# Patient Record
Sex: FEMALE | Race: AMERICAN INDIAN OR ALASKA NATIVE
[De-identification: names, ages, dates, MRNs, and addresses within clinical notes are randomized per-mention and may not be internally consistent; named-entity substitution may affect disease eponyms.]

---

## 2017-07-28 NOTE — PET REPORT
PET SB TO MT SUBSEQUENT:



HISTORY: Restaging of breast cancer.



TECHNIQUE: 13.8 millicuries F-18 FDG was administered intravenously.  

Noncontrast CT images and PET images were obtained from the skull base 

to the proximal thighs.  Fused images were reviewed on a workstation.  

The patient's blood glucose level measured 107.



COMPARISON: 1/16/14.





FINDINGS:



BRAIN: physiologic FDG uptake in the imaged brain.  



NECK: physiologic FDG uptake.



MEDIASTINUM: physiologic FDG uptake.



LUNGS: physiologic FDG uptake.



CHEST WALL: Surgical changes are noted in both breasts. No recurrent 

breast mass or chest wall mass is appreciated.



PLEURA/PERICARDIUM: physiologic FDG uptake. There is a new layering 

left pleural effusion measuring up to 2 cm in thickness. No pleural 

mass or nodularity is appreciated on noncontrast CT images.



THORACIC LYMPH NODES: physiologic FDG uptake.



HEPATOBILIARY: physiologic FDG uptake.  Mean liver SUV measures .



PANCREAS: physiologic FDG uptake.



SPLEEN: physiologic FDG uptake.



ADRENAL GLANDS: physiologic FDG uptake.



KIDNEYS/RENAL COLLECTING SYSTEMS: physiologic FDG uptake.



BOWEL/MESENTERY: physiologic FDG uptake.



PELVIC VISCERA: physiologic FDG uptake.



ABDOMINAL/PELVIC LYMPH NODES: physiologic FDG uptake.



MUSCULOSKELETAL: There is a new focus of increased radiotracer uptake 

in the proximal right femur with Max SUV measuring 7.7. There is subtle 

sclerosis in this area on the CT images. No additional bony lesions are 

appreciated.







IMPRESSION:

PET imaging demonstrates a new solitary bony metastasis to the proximal 

right femur since 1/16/14 exam.



There is a new small layering left pleural effusion as outlined above. 

No obvious pleural metastasis on the CT images or PET images.

## 2017-09-01 NOTE — ULTRASOUND REPORT
ULTRASOUND CHEST



History: Left pleural effusion.



Findings: Targeted grayscale ultrasound of the left side of the chest 

demonstrates a small left pleural effusion estimated at 17 cc. 

Thoracentesis was not performed.



Impression: Small left pleural effusion.

## 2017-09-22 ENCOUNTER — HOSPITAL ENCOUNTER (OUTPATIENT)
Dept: HOSPITAL 5 - XRAY | Age: 57
Discharge: HOME | End: 2017-09-22
Attending: SPECIALIST
Payer: MEDICARE

## 2017-09-22 DIAGNOSIS — M47.894: ICD-10-CM

## 2017-09-22 DIAGNOSIS — J90: Primary | ICD-10-CM

## 2017-09-22 PROCEDURE — 71020: CPT

## 2017-09-22 NOTE — XRAY REPORT
CHEST 2 VIEWS



INDICATION: Left effusion.



COMPARISON: 3/29/2010



FINDINGS: PA and lateral chest radiographs demonstrate increased left 

lower lung opacity/fluid with obscured left hemidiaphragm.  Clear 

remainder lungs.  New bilateral breast surgical clips with asymmetric 

soft tissue shadows.  Grossly normal heart size.  Stable chest port tip 

along the distal SVC.  Mild thoracic spondylosis.  Few surgical clips 

also project in the lower back soft tissues and upper anterior 

abdominal wall.



CONCLUSION: New bilateral breast postsurgical changes with increased 

left lower lung opacity/effusion and stable right chest port since 

2010, as described.



Thank you for the opportunity to participate in this patient's care.

## 2017-10-09 ENCOUNTER — HOSPITAL ENCOUNTER (OUTPATIENT)
Dept: HOSPITAL 5 - XRAY | Age: 57
Discharge: HOME | End: 2017-10-09
Attending: INTERNAL MEDICINE
Payer: MEDICARE

## 2017-10-09 DIAGNOSIS — I10: ICD-10-CM

## 2017-10-09 DIAGNOSIS — M89.8X5: ICD-10-CM

## 2017-10-09 DIAGNOSIS — M17.11: Primary | ICD-10-CM

## 2017-10-09 DIAGNOSIS — C50.912: ICD-10-CM

## 2017-10-09 NOTE — XRAY REPORT
RIGHT FEMUR RADIOGRAPHS



INDICATION: Bone lesion of right femur.



COMPARISON: 7/27/2017 PET/CT.



FINDINGS: AP and lateral radiographs demonstrate intact right femur.  

Small hypermetabolic focus felt along the right proximal femur lateral 

cortex at or just below the level of the lesser trochanter not clearly 

identified on the plain radiographs.  Unremarkable soft tissues.  Mild 

right knee degenerative changes.  Few right hemipelvic surgical clips.



CONCLUSION: No definite right femur plain radiographic abnormality with 

few other findings, as described above.  Please correlate.



Thank you for the opportunity to participate in this patient's care.

## 2018-01-09 ENCOUNTER — HOSPITAL ENCOUNTER (OUTPATIENT)
Dept: HOSPITAL 5 - CATHLABREC | Age: 58
Discharge: HOME | End: 2018-01-09
Attending: INTERNAL MEDICINE
Payer: MEDICARE

## 2018-01-09 DIAGNOSIS — Z53.8: ICD-10-CM

## 2018-01-09 DIAGNOSIS — C50.912: Primary | ICD-10-CM

## 2018-01-11 ENCOUNTER — HOSPITAL ENCOUNTER (OUTPATIENT)
Dept: HOSPITAL 5 - CATHLABREC | Age: 58
Discharge: HOME | End: 2018-01-11
Attending: INTERNAL MEDICINE
Payer: MEDICARE

## 2018-01-11 VITALS — SYSTOLIC BLOOD PRESSURE: 113 MMHG | DIASTOLIC BLOOD PRESSURE: 73 MMHG

## 2018-01-11 DIAGNOSIS — E66.9: ICD-10-CM

## 2018-01-11 DIAGNOSIS — I10: ICD-10-CM

## 2018-01-11 DIAGNOSIS — D36.7: Primary | ICD-10-CM

## 2018-01-11 DIAGNOSIS — M79.1: ICD-10-CM

## 2018-01-11 DIAGNOSIS — Z85.3: ICD-10-CM

## 2018-01-11 DIAGNOSIS — E55.9: ICD-10-CM

## 2018-01-11 DIAGNOSIS — C79.51: ICD-10-CM

## 2018-01-11 DIAGNOSIS — F32.9: ICD-10-CM

## 2018-01-11 DIAGNOSIS — D50.9: ICD-10-CM

## 2018-01-11 LAB
APTT BLD: 32.6 SEC. (ref 24.2–36.6)
BASOPHILS # (AUTO): 0 K/MM3 (ref 0–0.1)
BASOPHILS NFR BLD AUTO: 0.7 % (ref 0–1.8)
BUN SERPL-MCNC: 10 MG/DL (ref 7–17)
EOSINOPHIL # BLD AUTO: 0.3 K/MM3 (ref 0–0.4)
EOSINOPHIL NFR BLD AUTO: 5.2 % (ref 0–4.3)
HCT VFR BLD CALC: 36.8 % (ref 30.3–42.9)
HGB BLD-MCNC: 12.1 GM/DL (ref 10.1–14.3)
INR PPP: 1.06 (ref 0.87–1.13)
LYMPHOCYTES # BLD AUTO: 1 K/MM3 (ref 1.2–5.4)
LYMPHOCYTES NFR BLD AUTO: 18.4 % (ref 13.4–35)
MCH RBC QN AUTO: 28 PG (ref 28–32)
MCHC RBC AUTO-ENTMCNC: 33 % (ref 30–34)
MCV RBC AUTO: 85 FL (ref 79–97)
MONOCYTES # (AUTO): 0.4 K/MM3 (ref 0–0.8)
MONOCYTES % (AUTO): 7.6 % (ref 0–7.3)
PLATELET # BLD: 291 K/MM3 (ref 140–440)
RBC # BLD AUTO: 4.32 M/MM3 (ref 3.65–5.03)

## 2018-01-11 PROCEDURE — 84520 ASSAY OF UREA NITROGEN: CPT

## 2018-01-11 PROCEDURE — 88333 PATH CONSLTJ SURG CYTO XM 1: CPT

## 2018-01-11 PROCEDURE — 88305 TISSUE EXAM BY PATHOLOGIST: CPT

## 2018-01-11 PROCEDURE — 85025 COMPLETE CBC W/AUTO DIFF WBC: CPT

## 2018-01-11 PROCEDURE — 85730 THROMBOPLASTIN TIME PARTIAL: CPT

## 2018-01-11 PROCEDURE — 36415 COLL VENOUS BLD VENIPUNCTURE: CPT

## 2018-01-11 PROCEDURE — 20206 BIOPSY MUSCLE PERQ NEEDLE: CPT

## 2018-01-11 PROCEDURE — 88307 TISSUE EXAM BY PATHOLOGIST: CPT

## 2018-01-11 PROCEDURE — 77012 CT SCAN FOR NEEDLE BIOPSY: CPT

## 2018-01-11 PROCEDURE — 85610 PROTHROMBIN TIME: CPT

## 2018-01-11 PROCEDURE — 82565 ASSAY OF CREATININE: CPT

## 2018-01-11 NOTE — CAT SCAN REPORT
CT guided biopsy of left supraclavicular mass.



History: Breast cancer with palpable lump in the left supraclavicular 

region.



Procedure: The patient's skin surface overlying the left 

supraventricular region was prepped and draped using sterile technique. 

Local anesthetic was injected into the skin. There is increased soft 

tissue density in the left supraclavicular region which may represent 

edematous muscle. No enlarged lymph nodes are identified. Using CT 

guidance, a 17-gauge sheath needle was advanced into the area of 

interest in a single pass was made using an 18-gauge biopsy gun. 

Adequate tissue was obtained. Initial touch prep slides demonstrated 

muscle tissue, and thus the procedure was terminated. Intravenous 

conscious sedation was used. Intraservice time was 15 minutes. 

Independent cardiorespiratory monitoring was performed by the 

outpatient procedure nurse for 15 minutes. The patient tolerated the 

procedure well and was sent to the outpatient procedure unit for short 

term observation in satisfactory condition. There were no complications.

## 2018-01-11 NOTE — SHORT STAY SUMMARY
Short Stay Documentation


Date of service: 01/11/18





- History


Principal diagnosis: Breast cancerwith neckmass


Past Medical History: cancer





- Allergies and Medications


Current Medications: 


 Allergies





No Known Allergies Allergy (Verified 08/30/17 06:59)


 





 Home Medications











 Medication  Instructions  Recorded  Confirmed  Last Taken  Type


 


Duloxetine HCl [Cymbalta] 20 mg PO QDAY 08/30/17 01/11/18 01/10/18 History





    20mg 


 


Losartan/Hydrochlorothiazide 1 each PO DAILY 08/30/17 01/11/18 01/10/18 History





[Losartan-Hctz 100-25 mg Tab]    1 tab 


 


Gabapentin [Neurontin] 600 mg PO TID 01/11/18 01/11/18 01/10/18 History





    600mg 














- Physical exam


General appearance: no acute distress





- Brief post op/procedure progress note


Date of procedure: 01/11/18


Pre-op diagnosis: L neck mass


Post-op diagnosis: same


Procedure: 





Bx Lneckmass


Anesthesia: local


Surgeon: MAYCOL CARTER


Estimated blood loss: none


Specimen disposition: to lab


Condition: stable





- Disposition


Condition at discharge: Good


Disposition: DC-01 TO HOME OR SELFCARE





Short Stay Discharge Plan


Follow up with: 


FRANCISCA HERNANDEZ MD [Primary Care Provider] - 7 Days

## 2018-01-12 ENCOUNTER — HOSPITAL ENCOUNTER (OUTPATIENT)
Dept: HOSPITAL 5 - NM | Age: 58
Discharge: HOME | End: 2018-01-12
Attending: INTERNAL MEDICINE
Payer: MEDICARE

## 2018-01-12 DIAGNOSIS — C50.912: Primary | ICD-10-CM

## 2018-01-12 DIAGNOSIS — F32.9: ICD-10-CM

## 2018-01-12 DIAGNOSIS — C79.51: ICD-10-CM

## 2018-01-12 DIAGNOSIS — J32.9: ICD-10-CM

## 2018-01-12 DIAGNOSIS — Z79.899: ICD-10-CM

## 2018-01-12 DIAGNOSIS — I10: ICD-10-CM

## 2018-01-12 PROCEDURE — 78472 GATED HEART PLANAR SINGLE: CPT

## 2018-01-12 PROCEDURE — A9577 INJ MULTIHANCE: HCPCS

## 2018-01-12 PROCEDURE — 70553 MRI BRAIN STEM W/O & W/DYE: CPT

## 2018-01-12 PROCEDURE — A9560 TC99M LABELED RBC: HCPCS

## 2018-01-12 NOTE — MAGNETIC RESONANCE REPORT
MRI BRAIN WITH/WITHOUT CONTRAST:



History: Malignant neoplasm of left breast.



Technique: Multiple T1 and T2 weighted images were obtained in multiple 

planes.  Axial diffusion and gradient imaging was performed.  Post 

contrast T1 images in two planes were obtained following IV gadolinium.



Findings:



The brain parenchyma signal intensity and its gray-white interface are 

normal on all sequences.  No abnormal parenchymal signal.  



No diffusion restriction, hemorrhage, mass effect or extra-axial fluid 

collection.



Ventricular size is normal and symmetric.  The basal cisterns are clear.



The brainstem and cerebellar hemispheres are within normal limits.  The 

fourth ventricle is midline.



There is mild mucosal thickening throughout all paranasal sinuses. 

Normal flow voids are identified in the appropriate vessels at the 

[place].



No abnormal enhancement is identified following IV gadolinium.



Impression:

Unremarkable MRI brain with and without contrast. No evidence for 

metastatic disease to the brain.

Mild chronic sinusitis.

## 2018-01-12 NOTE — NUCLEAR MEDICINE REPORT
NUCLEAR MEDICINE MUGA GATED CARDIAC



History: Breast cancer



Findings:



Planar images of the heart demonstrate no obvious wall motion defects. 

The cardiac ejection fraction is measured at 63%. Heart rate measured 

80 beats per minute.



Impression:

The cardiac ejection fraction measures 63%.

## 2018-05-09 ENCOUNTER — HOSPITAL ENCOUNTER (OUTPATIENT)
Dept: HOSPITAL 5 - NM | Age: 58
Discharge: HOME | End: 2018-05-09
Attending: INTERNAL MEDICINE
Payer: MEDICARE

## 2018-05-09 DIAGNOSIS — C50.912: ICD-10-CM

## 2018-05-09 DIAGNOSIS — I10: ICD-10-CM

## 2018-05-09 DIAGNOSIS — Z79.899: ICD-10-CM

## 2018-05-09 DIAGNOSIS — C79.51: ICD-10-CM

## 2018-05-09 DIAGNOSIS — Z51.11: Primary | ICD-10-CM

## 2018-05-09 PROCEDURE — A9560 TC99M LABELED RBC: HCPCS

## 2018-05-09 PROCEDURE — 78472 GATED HEART PLANAR SINGLE: CPT

## 2018-05-10 NOTE — NUCLEAR MEDICINE REPORT
MUGA scan:



chemotherapy.



Following injection of technetium 99m tagged RBCs images of the left 

ventricle were obtained in the 30 and 35degrees projections. Ejection 

fractions of 58.4 and 64.1% respectively are obtained giving an average 

of 61.2%. In 2011 the patient had a 68% ejection fraction.



Impression:



61.2% ejection fraction.

## 2018-06-25 ENCOUNTER — HOSPITAL ENCOUNTER (OUTPATIENT)
Dept: HOSPITAL 5 - NM | Age: 58
Discharge: HOME | End: 2018-06-25
Payer: MEDICARE

## 2018-06-25 DIAGNOSIS — Z90.710: ICD-10-CM

## 2018-06-25 DIAGNOSIS — K21.9: ICD-10-CM

## 2018-06-25 DIAGNOSIS — F32.9: ICD-10-CM

## 2018-06-25 DIAGNOSIS — M19.90: ICD-10-CM

## 2018-06-25 DIAGNOSIS — I10: ICD-10-CM

## 2018-06-25 DIAGNOSIS — F41.9: ICD-10-CM

## 2018-06-25 DIAGNOSIS — C50.912: Primary | ICD-10-CM

## 2018-06-25 PROCEDURE — A9503 TC99M MEDRONATE: HCPCS

## 2018-06-25 PROCEDURE — 78306 BONE IMAGING WHOLE BODY: CPT

## 2018-06-25 NOTE — NUCLEAR MEDICINE REPORT
BONE SCAN:



History: Left breast cancer.



Comparison: No recent comparison. Correlation is made with a PET CT 

dated 11/2/17.



After injection of isotope, gamma camera imaging of the bony

system was done.  There is normal soft tissue, renal and bony activity.



There are 2 tiny foci of increased radiotracer uptake overlying the 

expected position of the T4 vertebral body and C6 vertebral body.



The previously described hypermetabolic activity in the proximal right 

femur is not demonstrated on bone scan.



No additional areas of abnormal uptake are detected.



IMPRESSION:

There are 2 tiny foci of increased radiotracer uptake at approximate T4 

and T6 levels concerning for metastatic disease. See above.

## 2018-07-05 ENCOUNTER — HOSPITAL ENCOUNTER (OUTPATIENT)
Dept: HOSPITAL 5 - PET | Age: 58
Discharge: HOME | End: 2018-07-05
Payer: MEDICARE

## 2018-07-05 DIAGNOSIS — Z90.710: ICD-10-CM

## 2018-07-05 DIAGNOSIS — Z90.13: ICD-10-CM

## 2018-07-05 DIAGNOSIS — C50.912: Primary | ICD-10-CM

## 2018-07-05 DIAGNOSIS — R91.8: ICD-10-CM

## 2018-07-05 DIAGNOSIS — I10: ICD-10-CM

## 2018-07-05 DIAGNOSIS — C79.51: ICD-10-CM

## 2018-07-05 DIAGNOSIS — D50.9: ICD-10-CM

## 2018-07-05 DIAGNOSIS — F41.9: ICD-10-CM

## 2018-07-05 DIAGNOSIS — F32.9: ICD-10-CM

## 2018-07-05 DIAGNOSIS — K21.9: ICD-10-CM

## 2018-07-05 DIAGNOSIS — E66.9: ICD-10-CM

## 2018-07-05 DIAGNOSIS — R73.09: ICD-10-CM

## 2018-07-05 DIAGNOSIS — M19.90: ICD-10-CM

## 2018-07-05 DIAGNOSIS — J90: ICD-10-CM

## 2018-07-05 PROCEDURE — 78815 PET IMAGE W/CT SKULL-THIGH: CPT

## 2018-07-05 PROCEDURE — A9552 F18 FDG: HCPCS

## 2018-07-05 PROCEDURE — 82962 GLUCOSE BLOOD TEST: CPT

## 2018-07-06 NOTE — PET REPORT
PET/CT:07/05/18 12:41:00



CLINICAL: Breast cancer restaging.

RADIOPHARMACEUTICAL: 14.949mCi F18-FDG.





COMPARISON: 11/02/17 PET/CT



TECHNIQUE-

Following intravenous injection of F-18 FDG and an approximately 60 

minute uptake period,  CT and PET images from the mid skull to the 

upper thighs were acquired with the patient in the fasted state.  No 

contrast was administered.  The CT protocol used for this PET CT study 

is designed for attenuation correction and anatomic localization of PET 

abnormalities.  This  CT is not desired to produce and cannot 

replace, state-of-the-art diagnostic CT scans with specific imaging 

protocols for different body parts and indications.



Plasma glucose at the time of this test: 100g/dl.



The standardized uptake values (SUV) are normalized to patient body 

weight and indicate the highest activity concentration (SUV max) in a 

given disease site.





FINDINGS:



Brain--Physiologic FDG uptake in the visualized regions of the brain.



Neck--Physiologic FDG uptake in mucosal structures.  No mass or 

lymphadenopathy.



Chest--Physiologic FDG uptake in mediastinal blood pool and myocardium.



Lungs--No abnormal uptake.  Decreased size and number of bilateral 

upper lobe noncalcified non-FDG avid lung nodules.  The previously 

described 4 mm left upper lobe nodule has resolved and 2 residual left 

upper lobe nodules measure 3 and 2 mm.  The previously described 8mm 

right upper lobe nodule is nearly completely gone and a poorly 

marginated low density opacity remains in the right upper lobe.



Pleura/pericardium--No abnormal uptake.  Large bilateral pleural 

effusions.  The left is unchanged compared to the previous exam and the 

right pleural effusion is new.



Thoracic nodes--No abnormal uptake.



Hepatobiliary--No abnormal uptake.  Liver background SUV mean, as a 

reference for comparing FDG studies, is 3.2 compared to 3.6 on the last 

exam.  No liver mass.

      

Spleen--No abnormal uptake.



Pancreas--No abnormal uptake.



Adrenal Glands--No abnormal uptake.



Kidneys/Ureters/Bladder--No abnormal uptake.



Abdominopelvic Nodes--No abnormal uptake.



Bowel/Peritoneum/Mesentery--No abnormal uptake.



Pelvic organs--No abnormal uptake.



Bones/Soft Tissues--No abnormal uptake.  Previously described lytic 

lesions of the thoracic spine are now sclerotic and are non-FDG avid.  

The lesions are at T5 and T7 rather than at T4 and T6 as stated in the 

last report.  The proximal right femur is normal. No new bone lesions.



Other findings: Status post bilateral mastectomy with stable appearance 

of the reconstructed breasts and chest wall.





IMPRESSION-

1.  Interval improvement with decreased size and number of bilateral 

upper lobe pulmonary metastases.  Despite the improvement, there is a 

new large right pleural effusion.  2.  Positive treatment response in 

skeletal metastases at T5 and T7.  3.  No new disease.

## 2018-07-20 ENCOUNTER — HOSPITAL ENCOUNTER (OUTPATIENT)
Dept: HOSPITAL 5 - CATHLABREC | Age: 58
Discharge: HOME | End: 2018-07-20
Payer: MEDICARE

## 2018-07-20 VITALS — SYSTOLIC BLOOD PRESSURE: 123 MMHG | DIASTOLIC BLOOD PRESSURE: 63 MMHG

## 2018-07-20 DIAGNOSIS — Z79.01: ICD-10-CM

## 2018-07-20 DIAGNOSIS — J90: Primary | ICD-10-CM

## 2018-07-20 LAB
APTT BLD: 31.1 SEC. (ref 24.2–36.6)
INR PPP: 1.03 (ref 0.87–1.13)

## 2018-07-20 PROCEDURE — 93970 EXTREMITY STUDY: CPT

## 2018-07-20 PROCEDURE — 32555 ASPIRATE PLEURA W/ IMAGING: CPT

## 2018-07-20 PROCEDURE — 85730 THROMBOPLASTIN TIME PARTIAL: CPT

## 2018-07-20 PROCEDURE — 71045 X-RAY EXAM CHEST 1 VIEW: CPT

## 2018-07-20 PROCEDURE — 85610 PROTHROMBIN TIME: CPT

## 2018-07-20 PROCEDURE — 36415 COLL VENOUS BLD VENIPUNCTURE: CPT

## 2018-07-20 NOTE — SHORT STAY SUMMARY
Short Stay Documentation


Date of service: 07/20/18





- History


Principal diagnosis: left pleural effusion


H&P: dictated


Past Medical History: cancer





- Allergies and Medications


Current Medications: 


 Allergies





No Known Allergies Allergy (Verified 08/30/17 06:59)


 





 Home Medications











 Medication  Instructions  Recorded  Confirmed  Last Taken  Type


 


Duloxetine HCl [Cymbalta] 20 mg PO QDAY 08/30/17 07/20/18 07/19/18 History





    20 mg 


 


Losartan/Hydrochlorothiazide 1 each PO DAILY 08/30/17 07/20/18 07/19/18 History





[Losartan-Hctz 100-25 mg Tab]    1 tab 


 


Gabapentin [Neurontin] 600 mg PO TID 01/11/18 07/20/18 07/19/18 History





    600 mg 














- Physical exam


General appearance: no acute distress (decreased air movement on left)





- Brief post op/procedure progress note


Date of procedure: 07/20/18


Pre-op diagnosis: left pleural effusion


Post-op diagnosis: same


Procedure: 





US thoracentesis


Anesthesia: local


Findings: 





moderate left pleural fluid


Surgeon: PELON ORTEGA


Estimated blood loss: none


Pathology: none


Specimen disposition: discarded


Condition: stable





- Hospital course


Hospital course: 





uneventful





- Disposition


Condition at discharge: Good


Disposition: DC-01 TO HOME OR SELFCARE





Short Stay Discharge Plan


Follow up with: 


FRANCISCA HERNANDEZ MD [Primary Care Provider] - 7 Days

## 2018-07-20 NOTE — XRAY REPORT
AP CHEST:



HISTORY: Post thoracentesis, shortness of breath, evaluate for 

pneumothorax



Recent ultrasound guided left thoracentesis was performed. Complete 

evacuation of the left pleural fluid is demonstrated. No pneumothorax 

is identified. The lungs are generally clear. Heart size is within 

normal limits. Right Dzvzyi-l-Ogmz is in good position.



IMPRESSION:

Unremarkable AP chest. No pneumothorax is visualized.

## 2018-07-20 NOTE — ULTRASOUND REPORT
ULTRASOUND THORACENTESIS



History: Left pleural effusion.



Description of procedure: Informed consent was obtained. Sterile 

technique was utilized. 1% lidocaine for skin anesthesia. Using 

ultrasound guidance, a 5 Arabic centesis needle was advanced into a 

simple appearing left pleural fluid collection. There was spontaneous 

return of clear yellow fluid. 900 cc of fluid was collected and 

discarded. No labs were ordered by the ordering physician. No 

complications.



Impression: Successful therapeutic left thoracentesis under ultrasound 

guidance.

## 2018-08-14 ENCOUNTER — HOSPITAL ENCOUNTER (OUTPATIENT)
Dept: HOSPITAL 5 - ECHO | Age: 58
Discharge: HOME | End: 2018-08-14
Attending: INTERNAL MEDICINE
Payer: MEDICARE

## 2018-08-14 DIAGNOSIS — Z90.710: ICD-10-CM

## 2018-08-14 DIAGNOSIS — J90: ICD-10-CM

## 2018-08-14 DIAGNOSIS — Z90.12: ICD-10-CM

## 2018-08-14 DIAGNOSIS — F41.9: ICD-10-CM

## 2018-08-14 DIAGNOSIS — I10: ICD-10-CM

## 2018-08-14 DIAGNOSIS — F32.9: ICD-10-CM

## 2018-08-14 DIAGNOSIS — K21.9: ICD-10-CM

## 2018-08-14 DIAGNOSIS — I34.0: ICD-10-CM

## 2018-08-14 DIAGNOSIS — I07.1: Primary | ICD-10-CM

## 2018-08-14 PROCEDURE — 93306 TTE W/DOPPLER COMPLETE: CPT

## 2018-12-06 ENCOUNTER — HOSPITAL ENCOUNTER (OUTPATIENT)
Dept: HOSPITAL 5 - PET | Age: 58
Discharge: HOME | End: 2018-12-06
Attending: INTERNAL MEDICINE
Payer: MEDICARE

## 2018-12-06 DIAGNOSIS — C50.912: ICD-10-CM

## 2018-12-06 DIAGNOSIS — C79.51: Primary | ICD-10-CM

## 2018-12-06 DIAGNOSIS — K21.9: ICD-10-CM

## 2018-12-06 DIAGNOSIS — Z90.710: ICD-10-CM

## 2018-12-06 DIAGNOSIS — Z90.12: ICD-10-CM

## 2018-12-06 DIAGNOSIS — C80.0: ICD-10-CM

## 2018-12-06 DIAGNOSIS — M19.90: ICD-10-CM

## 2018-12-06 DIAGNOSIS — I10: ICD-10-CM

## 2018-12-06 PROCEDURE — 78815 PET IMAGE W/CT SKULL-THIGH: CPT

## 2018-12-06 PROCEDURE — A9552 F18 FDG: HCPCS

## 2018-12-06 PROCEDURE — 82962 GLUCOSE BLOOD TEST: CPT

## 2018-12-07 NOTE — PET REPORT
PET/CT:12/06/18 13:47:00



CLINICAL: Breast cancer restaging.

RADIOPHARMACEUTICAL: 14.78mCi F18-FDG.





COMPARISON: 07/05/18 and 11/02/17 PET/CT



TECHNIQUE-

Following intravenous injection of F-18 FDG and an approximately 60 

minute uptake period,  CT and PET images from the mid skull to the 

upper thighs were acquired with the patient in the fasted state.  No 

contrast was administered.  The CT protocol used for this PET CT study 

is designed for attenuation correction and anatomic localization of PET 

abnormalities.  This  CT is not desired to produce and cannot 

replace, state-of-the-art diagnostic CT scans with specific imaging 

protocols for different body parts and indications.



Plasma glucose at the time of this test: 94g/dl.



The standardized uptake values (SUV) are normalized to patient body 

weight and indicate the highest activity concentration (SUV max) in a 

given disease site.





FINDINGS:



Brain--Physiologic FDG uptake in the visualized regions of the brain.



Neck--Physiologic FDG uptake in mucosal structures.  No mass or 

lymphadenopathy.



Chest--Physiologic FDG uptake in mediastinal blood pool and myocardium.



Lungs--No abnormal uptake.  An oval circumscribed noncalcified non-FDG 

avid right upper lobe lung nodule measures 7 mm and correlates with the 

9 mm nodule on the 11/02/17 exam.  It was not seen on the last exam.  

No other lung nodules are identified.



Pleura/pericardium--No abnormal uptake.  Stable bilateral pleural 

effusions, left larger than right.



Thoracic nodes--No abnormal uptake.



Hepatobiliary--No abnormal uptake.  Liver background SUV mean, as a 

reference for comparing FDG studies, is 3.5 compared to 3.2 on the last 

exam.  No liver mass.

      

Spleen--No abnormal uptake.



Pancreas--No abnormal uptake.



Adrenal Glands--No abnormal uptake.



Kidneys/Ureters/Bladder--No abnormal uptake.



Abdominopelvic Nodes--No abnormal uptake.



Bowel/Peritoneum/Mesentery--No abnormal uptake.



Pelvic organs--No abnormal uptake.



Bones/Soft Tissues--No abnormal uptake.  Stable sclerotic lesions of 

the T5 and T7 vertebral bodies.  No new bone lesions.



Other findings: Status post bilateral mastectomy with stable appearance 

of the reconstructed breast and chest wall.



IMPRESSION-

      Stable disease.  I suspect that the non-FDG avid right upper lobe 

lung nodule was present but was not imaged on the last exam because of 

breathing motion.

## 2019-03-05 ENCOUNTER — HOSPITAL ENCOUNTER (OUTPATIENT)
Dept: HOSPITAL 5 - MRI | Age: 59
Discharge: HOME | End: 2019-03-05
Attending: INTERNAL MEDICINE
Payer: COMMERCIAL

## 2019-03-05 DIAGNOSIS — M43.16: ICD-10-CM

## 2019-03-05 DIAGNOSIS — I10: ICD-10-CM

## 2019-03-05 DIAGNOSIS — M19.90: ICD-10-CM

## 2019-03-05 DIAGNOSIS — Z90.12: ICD-10-CM

## 2019-03-05 DIAGNOSIS — M51.26: ICD-10-CM

## 2019-03-05 DIAGNOSIS — M21.371: ICD-10-CM

## 2019-03-05 DIAGNOSIS — M48.061: ICD-10-CM

## 2019-03-05 DIAGNOSIS — K21.9: ICD-10-CM

## 2019-03-05 DIAGNOSIS — Z90.710: ICD-10-CM

## 2019-03-05 DIAGNOSIS — M51.36: Primary | ICD-10-CM

## 2019-03-05 PROCEDURE — 72157 MRI CHEST SPINE W/O & W/DYE: CPT

## 2019-03-05 PROCEDURE — A9577 INJ MULTIHANCE: HCPCS

## 2019-03-05 PROCEDURE — 72158 MRI LUMBAR SPINE W/O & W/DYE: CPT

## 2019-03-05 NOTE — MAGNETIC RESONANCE REPORT
MRI THORACIC SPINE WITHOUT AND WITH CONTRAST: 03/05/19 10:56:00



CLINICAL: New onset right foot drop.  History of breast cancer with 

pulmonary and skeletal metastasis.  Previously identified vertebral 

body metastases at T5 and T7.



COMPARISON: PET/CT 12/06/18 and 07/05/18.





TECHNIQUE: Sagittal and axial T1 and T2, and sagittal STIR sequences 

plus sagittal and axial T1 fat-sat postcontrast sequences on a 1.5 

Marcela magnet.  20 cc of Multihance was injected into previously for 

contrast portion of the exam and consent was obtained prior to the 

administration of contrast.



FINDINGS: Normal vertebral body height, alignment and disk spaces.  The 

spinal cord is normal size with normal signal.  No mass or enhancing 

lesion of the cord or the spinal canal. The conus medullaris is normal 

and terminates at L1. Mixed signal of the T5 marrow correlates with 

previously identified metastatic disease.  The rest of the bodies have 

normal signal except for a focal lesion of the left posterolateral T7 

vertebral body.  This correlates with the previous the identified lytic 

lesion and this 4 mm lesion demonstrates enhancement on this exam.  

There is also subtle enhancement of the left T7 neural foramen without 

identifiable enlargement or mass the nerve root.  The disks are intact 

at all levels.  Mild multilevel disc bulges but no disk protrusions.



IMPRESSION: 1.  A 4 mm enhancing lesion of the T7 vertebral body 

correlates with a previously identified metastatic lesion which had 

become non-FDG avid and developed a sclerotic treatment response on 

previous exams.  The enhancement suggests that tumor growth has 

reactivated. 2.  No new bone lesions identified.  3.  Focal enhancement 

of the left T7 neural foramen without identifiable mass or nerve root 

enlargement is suspicious for extension of  metastatic disease into the 

neural foramen.  4.  No cord lesion identified.  5.  No clear 

explanation for foot drop.

## 2019-03-05 NOTE — MAGNETIC RESONANCE REPORT
MRI LUMBAR SPINE WITHOUT AND WITH CONTRAST: 03/05/19



CLINICAL: Newly acquired right foot drop.  History of breast cancer 

with pulmonary and skeletal metastasis.  Metastases involving T5 and T7 

have been imaged on several PET exams.



COMPARISON: PET/CT 12/06/18 at 07/05/18



TECHNIQUE: Sagittal and axial T1 and T2, sagittal STIR and sagittal and 

axial postcontrast T1 fat sat sequences a 1.5 Marcela magnet.  20.0 cc of 

Multihance was injected intravenously for the contrast portion of the 

exam and consent was obtained prior to the administration of the 

contrast.



FINDINGS: There is slight grade I L4-5 spondylolisthesis with no pars 

defect.  The rest of the vertebra are in normal alignment.  Normal 

marrow signal with no bone lesion identified.  Decreased T2 disc signal 

at L1-L4.  The conus medullaris is normal and terminates at L1-2.  No 

mass or enhancing lesion.  The soft tissues are normal except for fluid 

in the subcutaneous soft tissues of the back which are typical with 

someone who is in the supine position for an extended period.



L1-2: Intact.



L2-3: Intact.



L3-4: Intact.



L4-5: Mild circumferential disc bulge.  Bilateral facet hypertrophy 

contributes to mild bilateral neural foraminal narrowing.



L5-S1: Intact.



IMPRESSION: 1.  Slight grade I L4-5 spondylolisthesis and mild L4-5 

degenerative disc disease with mild bulge of the disc.  2.  Mild 

bilateral L4-5 neural foraminal narrowing.  3.  No evidence of 

metastatic disease.